# Patient Record
Sex: FEMALE | Race: BLACK OR AFRICAN AMERICAN | NOT HISPANIC OR LATINO | Employment: FULL TIME | URBAN - METROPOLITAN AREA
[De-identification: names, ages, dates, MRNs, and addresses within clinical notes are randomized per-mention and may not be internally consistent; named-entity substitution may affect disease eponyms.]

---

## 2017-07-18 ENCOUNTER — OFFICE VISIT (OUTPATIENT)
Dept: INTERNAL MEDICINE | Facility: CLINIC | Age: 37
End: 2017-07-18
Payer: COMMERCIAL

## 2017-07-18 VITALS
HEIGHT: 62 IN | DIASTOLIC BLOOD PRESSURE: 64 MMHG | BODY MASS INDEX: 40.57 KG/M2 | SYSTOLIC BLOOD PRESSURE: 102 MMHG | HEART RATE: 98 BPM | TEMPERATURE: 99 F | OXYGEN SATURATION: 99 % | WEIGHT: 220.44 LBS

## 2017-07-18 DIAGNOSIS — M72.2 PLANTAR FASCIITIS OF RIGHT FOOT: Primary | ICD-10-CM

## 2017-07-18 DIAGNOSIS — M79.89 BILATERAL SWELLING OF FEET: ICD-10-CM

## 2017-07-18 PROCEDURE — 99999 PR PBB SHADOW E&M-EST. PATIENT-LVL III: CPT | Mod: PBBFAC,,, | Performed by: FAMILY MEDICINE

## 2017-07-18 PROCEDURE — 99213 OFFICE O/P EST LOW 20 MIN: CPT | Mod: S$GLB,,, | Performed by: FAMILY MEDICINE

## 2017-07-18 RX ORDER — IBUPROFEN 800 MG/1
800 TABLET ORAL 3 TIMES DAILY
Qty: 30 TABLET | Refills: 0 | Status: SHIPPED | OUTPATIENT
Start: 2017-07-18

## 2017-07-18 NOTE — PROGRESS NOTES
Subjective:       Patient ID: Sonja Luis is a 36 y.o. female.    Chief Complaint: Foot Swelling (bilaterl/x 1 mth)    HPI Ms. Luis presents today with bilateral feet swelling for about a month. The swelling has been her feet and up to ankles. She hasn't been working this week and it hasn't been as bad. Right hurts. She has never had swelling in the past.   She has been working as a unit clerk for 2 years she is up and down in regards to sitting.   She has gained weight. She complains of pain more in the right foot. Pain occurs after rest more when she gets up in the morning she can barely walk. Pain she describes on the bottom of her foot starting at the heal coming forward.     Review of Systems    Pertinent ROS listed in HPI    Objective:      Physical Exam   Constitutional: She is oriented to person, place, and time. She appears well-developed and well-nourished.   HENT:   Head: Normocephalic and atraumatic.   Musculoskeletal: Normal range of motion. She exhibits edema.        Feet:    Neurological: She is alert and oriented to person, place, and time.   Vitals reviewed.        Assessment/Plan:   Plantar fasciitis of right foot  -     ibuprofen (ADVIL,MOTRIN) 800 MG tablet; Take 1 tablet (800 mg total) by mouth 3 (three) times daily.  Dispense: 30 tablet; Refill: 0  Gave handout with stretches. Gave handout on icing and other ways to treat symptoms. Recommend arch support and insoles for work    Bilateral swelling of feet  Discussed compression socks, rest and elevation for now. Will follow up once plantar fasciitis calms down.       Return if symptoms worsen or fail to improve.    Nancy Heredia MD  [facility]   Family Medicine

## 2017-07-18 NOTE — PATIENT INSTRUCTIONS
Understanding Plantar Fasciitis    Plantar fasciitis is a condition that causes foot and heel pain. The plantar fascia is a tough band of tissue that runs across the bottom of the foot from the heel to the toes. This tissue pulls on the heel bone. It supports the arch of the foot as it pushes off the ground. If the tissue becomes irritated or red and swollen (inflamed), it is called plantar fasciitis.  How to say it  PLAN-tuhr fa-see-IY-[id number]   What causes plantar fasciitis?  Plantar fasciitis most often occurs from overusing the plantar fascia. The tissue may become damaged from activities that put repeated stress on the heel and foot. Or it may wear down over time with age and ankle stiffness. You are more likely to have plantar fasciitis if you:  · Do activities that require a lot of running, jumping, or dancing  · Have a job that requires being on your feet for long periods  · Are overweight or obese  · Have certain foot problems, such as a tight Achilles tendon, flat feet, or high arches  · Often wear poorly fitting shoes  Symptoms of plantar fasciitis  The condition most often causes pain in the heel and the bottom of the foot. The pain may occur when you take your first steps in the morning. It may get better as you walk throughout the day. But as you continue to put weight on the foot, the pain often returns. Pain may also occur after standing or sitting for long periods.  Treating plantar fasciitis  Treatments for plantar fasciitis include:  · Resting the foot. This involves limiting movements that make your foot hurt. You may also need to avoid certain sports and types of work for a time.  · Using cold packs. Put an ice pack on the heel and foot to help reduce pain and swelling.  · Taking pain medicines. Prescription and over-the-counter pain medicines can help relieve pain and swelling.  · Using heel cups or foot inserts (orthotics). These are placed in the shoes to help support the heel or arch and  cushion the heel. You may also be told to buy proper-fitting shoes with good arch support and cushioned soles.  · Taping the foot. This supports the arch and limits the movement of the plantar fascia to help relieve symptoms.  · Wearing a night splint. This stretches the plantar fascia and leg muscles while you sleep. This may help relieve pain.  · Doing exercises and physical therapy. These stretch and strengthen the plantar fascia and the muscles in the leg that support the heel and foot.  · Getting shots of medicine into the foot. These may help relieve symptoms for a time.  · Having surgery. This may be needed if other treatments fail to relieve symptoms. During surgery, the surgeon may partially cut the plantar fascia to release tension.  Possible complications of plantar fasciitis  Without proper care and treatment, healing may take longer than normal. Also, symptoms may continue or get worse. Over time, the plantar fascia may be damaged. This can make it hard to walk or even stand without pain.  When to call your healthcare provider  Call your healthcare provider right away if you have any of these:  · Fever of 100.4°F (38°C) or higher, or as directed  · Symptoms that dont get better with treatment, or get worse  · New symptoms, such as numbness, tingling, or weakness in the foot   Date Last Reviewed: 3/10/2016  © 0409-2186 [employer]. [address] [place] [address], [place] [address]. All rights reserved. This information is not intended as a substitute for professional medical care. Always follow your healthcare professional's instructions.        Treating Plantar Fasciitis  First, your healthcare provider tries to determine the cause of your problem in order to suggest ways to relieve pain. If your pain is due to poor foot mechanics, custom-made shoe inserts (orthoses) may help.    Reduce symptoms  · To relieve mild symptoms, try aspirin, ibuprofen, or other medicines as directed. Rubbing ice on  the affected area may also help.  · To reduce severe pain and swelling, your healthcare provider may prescribe pills or injections or a walking cast in some instances. Physical therapy, such as ultrasound or a daily stretching program, may also be recommended. Surgery is rarely required.  · To reduce symptoms caused by poor foot mechanics, your foot may be taped. This supports the arch and temporarily controls movement. Night splints may also help by stretching the fascia.  Control movement  If taping helps, your healthcare provider may prescribe orthoses. Built from plaster casts of your feet, these inserts control the way your foot moves. As a result, your symptoms should go away.  Reduce overuse  Every time your foot strikes the ground, the plantar fascia is stretched. You can reduce the strain on the plantar fascia and the possibility of overuse by following these suggestions:  · Lose any excess weight.  · Avoid running on hard or uneven ground.  · Use orthoses at all times in your shoes and house slippers.  If surgery is needed  Your healthcare provider may consider surgery if other types of treatment don't control your pain. During surgery, the plantar fascia is partially cut to release tension. As you heal, fibrous tissue fills the space between the heel bone and the plantar fascia.   Date Last Reviewed: 10/14/2015  © 7506-6162 [employer]. [address] [place] [address], [place] [address]. All rights reserved. This information is not intended as a substitute for professional medical care. Always follow your healthcare professional's instructions.

## 2018-06-04 ENCOUNTER — LAB VISIT (OUTPATIENT)
Dept: LAB | Facility: HOSPITAL | Age: 38
End: 2018-06-04
Attending: FAMILY MEDICINE
Payer: COMMERCIAL

## 2018-06-04 ENCOUNTER — OFFICE VISIT (OUTPATIENT)
Dept: INTERNAL MEDICINE | Facility: CLINIC | Age: 38
End: 2018-06-04
Payer: COMMERCIAL

## 2018-06-04 VITALS
BODY MASS INDEX: 40.57 KG/M2 | HEIGHT: 62 IN | DIASTOLIC BLOOD PRESSURE: 66 MMHG | OXYGEN SATURATION: 98 % | SYSTOLIC BLOOD PRESSURE: 98 MMHG | TEMPERATURE: 98 F | HEART RATE: 84 BPM | WEIGHT: 220.44 LBS

## 2018-06-04 DIAGNOSIS — Z13.220 SCREENING CHOLESTEROL LEVEL: ICD-10-CM

## 2018-06-04 DIAGNOSIS — M72.2 BILATERAL PLANTAR FASCIITIS: Primary | ICD-10-CM

## 2018-06-04 LAB
CHOLEST SERPL-MCNC: 195 MG/DL
CHOLEST/HDLC SERPL: 3.8 {RATIO}
HDLC SERPL-MCNC: 52 MG/DL
HDLC SERPL: 26.7 %
LDLC SERPL CALC-MCNC: 134.4 MG/DL
NONHDLC SERPL-MCNC: 143 MG/DL
TRIGL SERPL-MCNC: 43 MG/DL

## 2018-06-04 PROCEDURE — 99999 PR PBB SHADOW E&M-EST. PATIENT-LVL III: CPT | Mod: PBBFAC,,, | Performed by: FAMILY MEDICINE

## 2018-06-04 PROCEDURE — 80061 LIPID PANEL: CPT

## 2018-06-04 PROCEDURE — 36415 COLL VENOUS BLD VENIPUNCTURE: CPT

## 2018-06-04 PROCEDURE — 99214 OFFICE O/P EST MOD 30 MIN: CPT | Mod: S$GLB,,, | Performed by: FAMILY MEDICINE

## 2018-06-04 PROCEDURE — 3008F BODY MASS INDEX DOCD: CPT | Mod: CPTII,S$GLB,, | Performed by: FAMILY MEDICINE

## 2018-06-04 RX ORDER — SPIRONOLACTONE 50 MG/1
100 TABLET, FILM COATED ORAL
COMMUNITY
Start: 2017-08-16 | End: 2018-08-16

## 2018-06-04 RX ORDER — TRETINOIN 1 MG/G
CREAM TOPICAL
COMMUNITY
Start: 2017-08-16 | End: 2018-08-16

## 2018-06-04 RX ORDER — BENZOYL PEROXIDE 50 MG/ML
LIQUID TOPICAL
COMMUNITY
Start: 2017-08-16 | End: 2018-08-16

## 2018-06-04 NOTE — MEDICAL/APP STUDENT
Subjective:       Patient ID: Sonja uLis is a 37 y.o. female.    Chief Complaint: Plantar Fasciitis and Foot Swelling (foot and leg swelling)    HPI   Ms. Luis is a pleasant 37 y.o. F with PMHx of R plantar fasciitis and bilateral foot edema who presents for follow up of these problems.  She was last seen by me in July 2017.  At that time, her problems had been ongoing for a month.  I prescribed conservative measures (NSAIDs, stretches, arch support, icing, compression socks), and advised her to lose some weight.    Today unfortunately her symptoms are worse.  Her L foot is now also having plantar fasciitis, and her bilat edema now extends up to the tibial tuberosity.  She describes her plantar fasciitis as 8/10 throbbing in both plantar fasciae, accompanied by 8/10 shooting pain up the back of both calves.  It is worsened by standing, and none of the conservative measures I prescribed last year helped.  She says she has been unable to maintain any weight loss because the pain prevents her from exercising.    She is also due for a lipid panel.    Review of Systems   Constitutional: Negative for activity change, appetite change and fever.   HENT: Negative for congestion, postnasal drip, rhinorrhea, sinus pain, sinus pressure, sneezing, sore throat and tinnitus.    Eyes: Negative for pain, redness, itching and visual disturbance.   Respiratory: Negative for cough, chest tightness, shortness of breath and wheezing.    Cardiovascular: Positive for leg swelling (bilat).   Gastrointestinal: Negative.    Genitourinary: Negative for difficulty urinating, dysuria, flank pain and hematuria.   Musculoskeletal: Positive for arthralgias (bilat plantar fasciitis). Negative for back pain, joint swelling and myalgias.   Skin: Negative for color change, rash and wound.   Neurological: Negative for dizziness, seizures, weakness, numbness and headaches.   Hematological: Does not bruise/bleed easily.    Psychiatric/Behavioral: Negative for decreased concentration. The patient is not nervous/anxious.        Objective:      Physical Exam   Constitutional: She is oriented to person, place, and time. She appears well-developed and well-nourished. No distress.   HENT:   Head: Normocephalic and atraumatic.   Right Ear: External ear normal.   Left Ear: External ear normal.   Mouth/Throat: Oropharynx is clear and moist.   Eyes: Conjunctivae and EOM are normal. Pupils are equal, round, and reactive to light.   Neck: Normal range of motion. Neck supple. No thyromegaly present.   Cardiovascular: Normal rate, regular rhythm, normal heart sounds and intact distal pulses.    Pulmonary/Chest: Effort normal and breath sounds normal. She has no wheezes. She has no rales.   Abdominal: Soft. Bowel sounds are normal. She exhibits no distension and no mass. There is no tenderness. There is no guarding.   Musculoskeletal: Normal range of motion. She exhibits edema (bilat 1+ pitting edema extending from tibial tuberosities down). She exhibits no tenderness.        Legs:       Feet:    Edema extends from tibial tuberosity down to toes   Lymphadenopathy:     She has no cervical adenopathy.   Neurological: She is alert and oriented to person, place, and time. No cranial nerve deficit.   Skin: Skin is warm and dry. Capillary refill takes less than 2 seconds.   Psychiatric: She has a normal mood and affect. Her behavior is normal. Judgment and thought content normal.   Nursing note and vitals reviewed.      Assessment:       1. Bilateral plantar fasciitis    2. Screening cholesterol level        Plan:       1. Amb referral to podiatry, as conservative measures for 6+ months have not worked (please consider glucocorticoid injections).    2. Lipid panel today.

## 2018-06-04 NOTE — PROGRESS NOTES
Subjective:       Patient ID: Sonja Luis is a 37 y.o. female.    Chief Complaint: Plantar Fasciitis and Foot Swelling (foot and leg swelling)    HPI Ms. Luis presents with PMHx of R plantar fasciitis and bilateral foot edema for follow up of these problems.  She was last seen by me in July 2017.  At that time, her problems had been ongoing for a month.  I prescribed conservative measures (NSAIDs, stretches, arch support, icing, compression socks), and advised her to lose some weight.     Today unfortunately her symptoms are worse.  Her L foot is now also having plantar fasciitis, and her bilat edema now extends up to the tibial tuberosity.  She describes her plantar fasciitis as 8/10 throbbing in both plantar fasciae, accompanied by 8/10 shooting pain up the back of both calves.  It is worsened by standing, and none of the conservative measures I prescribed last year helped.  She says she has been unable to maintain any weight loss because the pain prevents her from exercising.     She is also due for a lipid panel.    Review of Systems    Constitutional: Negative for activity change, appetite change and fever.   HENT: Negative for congestion, postnasal drip, rhinorrhea, sinus pain, sinus pressure, sneezing, sore throat and tinnitus.    Eyes: Negative for pain, redness, itching and visual disturbance.   Respiratory: Negative for cough, chest tightness, shortness of breath and wheezing.    Cardiovascular: Positive for leg swelling (bilat).   Gastrointestinal: Negative.    Genitourinary: Negative for difficulty urinating, dysuria, flank pain and hematuria.   Musculoskeletal: Positive for arthralgias (bilat plantar fasciitis). Negative for back pain, joint swelling and myalgias.   Skin: Negative for color change, rash and wound.   Neurological: Negative for dizziness, seizures, weakness, numbness and headaches.   Hematological: Does not bruise/bleed easily.   Psychiatric/Behavioral: Negative for decreased  concentration. The patient is not nervous/anxious.    No past medical history on file.  Objective:        Physical Exam    Constitutional: She is oriented to person, place, and time. She appears well-developed and well-nourished. No distress.   HENT:   Head: Normocephalic and atraumatic.   Right Ear: External ear normal.   Left Ear: External ear normal.   Mouth/Throat: Oropharynx is clear and moist.   Eyes: Conjunctivae and EOM are normal. Pupils are equal, round, and reactive to light.   Neck: Normal range of motion. Neck supple. No thyromegaly present.   Cardiovascular: Normal rate, regular rhythm, normal heart sounds and intact distal pulses.    Pulmonary/Chest: Effort normal and breath sounds normal. She has no wheezes. She has no rales.   Abdominal: Soft. Bowel sounds are normal. She exhibits no distension and no mass. There is no tenderness. There is no guarding.   Musculoskeletal: Normal range of motion. She exhibits edema (bilat 1+ pitting edema extending from tibial tuberosities down). She exhibits no tenderness.        Legs:       Feet:    Edema extends from tibial tuberosity down to toes   Lymphadenopathy:     She has no cervical adenopathy.   Neurological: She is alert and oriented to person, place, and time. No cranial nerve deficit.   Skin: Skin is warm and dry. Capillary refill takes less than 2 seconds.   Psychiatric: She has a normal mood and affect. Her behavior is normal. Judgment and thought content normal.   Nursing note and vitals reviewed.      Assessment/Plan:   Bilateral plantar fasciitis  -     Ambulatory Referral to Podiatry  conservative measures for 6+ months have not worked (please consider glucocorticoid injections).  Discussed option of surgery as a last resort.     Screening cholesterol level  -     Lipid panel; Future; Expected date: 06/04/2018      Follow-up in about 3 months (around 9/4/2018), or if symptoms worsen or fail to improve.    Nancy Heredia MD  [facility]   Family Medicine

## 2018-06-26 DIAGNOSIS — M79.671 BILATERAL FOOT PAIN: Primary | ICD-10-CM

## 2018-06-26 DIAGNOSIS — M79.672 BILATERAL FOOT PAIN: Primary | ICD-10-CM

## 2018-06-27 ENCOUNTER — HOSPITAL ENCOUNTER (OUTPATIENT)
Dept: RADIOLOGY | Facility: HOSPITAL | Age: 38
Discharge: HOME | End: 2018-06-27
Attending: PODIATRIST
Payer: COMMERCIAL

## 2018-06-27 ENCOUNTER — OFFICE VISIT (OUTPATIENT)
Dept: PODIATRY | Facility: CLINIC | Age: 38
End: 2018-06-27
Payer: COMMERCIAL

## 2018-06-27 VITALS
SYSTOLIC BLOOD PRESSURE: 113 MMHG | BODY MASS INDEX: 40.57 KG/M2 | DIASTOLIC BLOOD PRESSURE: 76 MMHG | HEART RATE: 69 BPM | WEIGHT: 220.44 LBS | HEIGHT: 62 IN

## 2018-06-27 DIAGNOSIS — M79.672 BILATERAL FOOT PAIN: ICD-10-CM

## 2018-06-27 DIAGNOSIS — M72.2 PLANTAR FASCIITIS, BILATERAL: Primary | ICD-10-CM

## 2018-06-27 DIAGNOSIS — M79.671 BILATERAL FOOT PAIN: ICD-10-CM

## 2018-06-27 DIAGNOSIS — M62.462 GASTROCNEMIUS EQUINUS OF LEFT LOWER EXTREMITY: ICD-10-CM

## 2018-06-27 DIAGNOSIS — M62.461 GASTROCNEMIUS EQUINUS OF RIGHT LOWER EXTREMITY: ICD-10-CM

## 2018-06-27 PROCEDURE — 99243 OFF/OP CNSLTJ NEW/EST LOW 30: CPT | Mod: S$GLB,,, | Performed by: PODIATRIST

## 2018-06-27 PROCEDURE — 73630 X-RAY EXAM OF FOOT: CPT | Mod: 50,TC,FY,PO

## 2018-06-27 PROCEDURE — 73630 X-RAY EXAM OF FOOT: CPT | Mod: 26,50,, | Performed by: RADIOLOGY

## 2018-06-27 PROCEDURE — 99999 PR PBB SHADOW E&M-EST. PATIENT-LVL III: CPT | Mod: PBBFAC,,, | Performed by: PODIATRIST

## 2018-06-27 RX ORDER — METHYLPREDNISOLONE 4 MG/1
TABLET ORAL
Qty: 1 PACKAGE | Refills: 0 | Status: SHIPPED | OUTPATIENT
Start: 2018-06-27 | End: 2018-07-18

## 2018-06-27 NOTE — LETTER
June 27, 2018      Nancy Heredia MD  [address] [place] [address] [address] [place] [address]           [facility] - Podiatry  [address] [place] [address]  Phone: [phone]  Fax: [phone]          Patient: Sonja Luis   MR Number: [id number]   YOB: 1980   Date of Visit: 6/27/2018       Dear Dr. Nancy Heredia:    Thank you for referring Sonja Luis to me for evaluation. Attached you will find relevant portions of my assessment and plan of care.    If you have questions, please do not hesitate to call me. I look forward to following Sonja Luis along with you.    Sincerely,    Adilene Davila, DPM    Enclosure  CC:  No Recipients    If you would like to receive this communication electronically, please contact [email or web address] or [phone] to request more information on [employer] Link access.    For providers and/or their staff who would like to refer a patient to [facility], please contact us through our one-stop-shop provider referral line, [facility], at [phone].    If you feel you have received this communication in error or would no longer like to receive these types of communications, please e-mail [email or web address]

## 2018-06-27 NOTE — PROGRESS NOTES
PODIATRY CONSULTATION NOTE  Dr. Heredia- consult request    CHIEF COMPLAINT  Chief Complaint   Patient presents with    Heel Pain     Bilateral heel pain rated 10/10 at worst. Pain is worst in the morning and during ambulation       HPI  Sonja Luis is a 37 y.o. female who complains of pain to the bilateral heel.  Pain is described as achy.  Pain is worst in the morning and after periods of sitting down and then getting up and walking again.   Pain is rated to be 10/10 by the patient on a 1-10 scale.   Pain has been present for  One year .  Pain is worsening.  Pain is aggravated by weight bearing.  Pain is improved by rest.  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    PMH  History reviewed. No pertinent past medical history.     PSH  Past Surgical History:   Procedure Laterality Date     SECTION      X 2    DILATION AND CURETTAGE OF UTERUS          MEDS  Current Outpatient Prescriptions on File Prior to Visit   Medication Sig Dispense Refill    benzoyl peroxide 5 % external liquid Apply topically.      ibuprofen (ADVIL,MOTRIN) 800 MG tablet Take 1 tablet (800 mg total) by mouth 3 (three) times daily. 30 tablet 0    spironolactone (ALDACTONE) 50 MG tablet Take 100 mg by mouth.      tretinoin (RETIN-A) 0.1 % cream Apply topically.      [DISCONTINUED] triamcinolone acetonide (KENALOG) 10 mg/mL injection 2.5 mg by INTRALESIONAL route.       No current facility-administered medications on file prior to visit.       Medication List with Changes/Refills   New Medications    METHYLPREDNISOLONE (MEDROL DOSEPACK) 4 MG TABLET    use as directed   Current Medications    BENZOYL PEROXIDE 5 % EXTERNAL LIQUID    Apply topically.    IBUPROFEN (ADVIL,MOTRIN) 800 MG TABLET    Take 1 tablet (800 mg total) by mouth 3 (three) times daily.    SPIRONOLACTONE (ALDACTONE) 50 MG TABLET    Take 100 mg by mouth.    TRETINOIN (RETIN-A) 0.1 % CREAM    " Apply topically.   Discontinued Medications    TRIAMCINOLONE ACETONIDE (KENALOG) 10 MG/ML INJECTION    2.5 mg by INTRALESIONAL route.       ALLG  Review of patient's allergies indicates:  No Known Allergies    SOCIAL Hx  Social History     Social History    Marital status: Single     Spouse name: N/A    Number of children: N/A    Years of education: N/A     Social History Main Topics    Smoking status: Never Smoker    Smokeless tobacco: Never Used    Alcohol use No    Drug use: No    Sexual activity: Not Asked     Other Topics Concern    None     Social History Narrative    None       FAM Hx  History reviewed. No pertinent family history.    Review of systems:  The patient denies nausea, vomiting, fevers, chills, shortness of breath, chest pain, and calf pain.      OBJECTIVE:  Vitals:    06/27/18 1021   BP: 113/76   Pulse: 69   Weight: 100 kg (220 lb 7.4 oz)   Height: 5' 2" (1.575 m)   PainSc: 0-No pain       Lower Extremity Physical Exam    Vascular exam:   · Dorsalis pedis and posterior tibial pulses palpable 2/4 bilaterally.   · Capillary refill time immediate to the toes.   · Feet are warm to the touch.   · There are no varicosities, telangiectasias noted to bilateral foot and ankle regions.   · There are no ecchymoses noted to bilateral foot and ankle regions.   · There is no gross lower extremity edema.    Dermatologic exam:   · Skin moist with healthy texture and turgor.  · There are no open ulcerations, lacerations, or fissures to bilateral foot and ankle regions. There are no signs of infection as there is no erythema, no proximal-extending lymphangiitis, no fluctuance, or crepitus noted on palpation to bilateral foot and ankle regions.   · There is no interdigital maceration.   · There are no hyperkeratotic lesions noted to feet. Nails are well-trimmed.    Neurologic exam:  ·  Epicritic sensation is intact as the patient is able to sense light touch to bilateral foot and ankle regions.   · There " is a negative Tinel's sign on percussion of the tibial nerve, dorsal cutaneous nerves, sural nerves of the bilateral foot.    Musculoskeletal exam:   · Pain on palpation plantar medial tubercle of calcaneus, bilateral foot. No pain along plantar fascia bilaterally.   · No pain with medial-lateral compression of calcaneus.  · 5/5 muscle strength in all 4 quadrants.   · Ankle joint range of motion decreased with knee extended and flexed b/l.      IMAGING   Reviewed by me and I agree with radiologist findings, 3 views of foot/ankle, reveal:  Results for orders placed during the hospital encounter of 06/27/18   X-Ray Foot Complete Bilateral    Narrative EXAMINATION:  XR FOOT COMPLETE 3 VIEW BILATERAL    CLINICAL HISTORY:  Pain in right foot    TECHNIQUE:  AP, lateral, and oblique views of both feet were performed.    COMPARISON:  None    FINDINGS:  There is soft tissue prominence about the bilateral forefoot regions.  No acute fracture or dislocation.  No osseous erosions.  Joint spaces are maintained.  Bones are normally mineralized      Impression As above      Electronically signed by: Moises Lopez MD  Date:    06/27/2018  Time:    10:08           Results for orders placed during the hospital encounter of 06/27/18   X-Ray Foot Complete Bilateral    Narrative EXAMINATION:  XR FOOT COMPLETE 3 VIEW BILATERAL    CLINICAL HISTORY:  Pain in right foot    TECHNIQUE:  AP, lateral, and oblique views of both feet were performed.    COMPARISON:  None    FINDINGS:  There is soft tissue prominence about the bilateral forefoot regions.  No acute fracture or dislocation.  No osseous erosions.  Joint spaces are maintained.  Bones are normally mineralized      Impression As above      Electronically signed by: Moises Lopez MD  Date:    06/27/2018  Time:    10:08                  ASSESSMENT:   Plantar fasciitis, bilateral    Gastrocnemius equinus of left lower extremity    Gastrocnemius equinus of right lower extremity    Other  orders  -     methylPREDNISolone (MEDROL DOSEPACK) 4 mg tablet; use as directed  Dispense: 1 Package; Refill: 0          PLAN:   1. The patient was examined and evaluated   2. Treatment options were discussed in detail; the patient elected to undergo conservative treatment. I Reviewed films with patient . Discussed different treatment options for heel pain.   3. I gave written and verbal instructions on heel cord stretching and this was demonstrated for the patient. A theraband was also provided to the patient for stretching exercises. Recommendations were given for plantar fasciitis treatment including icing, stretching, arch supports, avoidance of barefoot walking, appropriate shoe wear, and strict compliance. Discussed importance of patient to perform stretching exercises.   4. Prescription was written for OTC arch supports.  I Discussed that plantar fasciitis typically resolves on its own in a variable amount of time. If no improvement, will proceed with cortisone injection. I also  discussed possible tx with SLC, PT, Dynasplint and custom orthotics.Surgical intervention is the last resort for plantar fasciitis.     An Rx was written for medrol dosepak.   5. Information was given regarding the patient's condition and prognosis. All the patient's questions were answered.   6. Return to clinic in 4-6 weeks for follow up evaluation, or sooner if symptoms worsen.   7. The patient is welcome to call or email with any questions or concerns at any time.     Report Electronically Signed By:  Adilene Davila DPM   Podiatric Medicine & Surgery  [facility]  6/27/2018  10:28 AM

## 2018-11-21 ENCOUNTER — PATIENT OUTREACH (OUTPATIENT)
Dept: ADMINISTRATIVE | Facility: HOSPITAL | Age: 38
End: 2018-11-21

## 2018-11-21 NOTE — PROGRESS NOTES
Spoke with pt about scheduling [facility],pt scheduled appointment with me for 11-27-18.